# Patient Record
Sex: FEMALE | Race: WHITE | NOT HISPANIC OR LATINO | Employment: OTHER | URBAN - METROPOLITAN AREA
[De-identification: names, ages, dates, MRNs, and addresses within clinical notes are randomized per-mention and may not be internally consistent; named-entity substitution may affect disease eponyms.]

---

## 2023-11-09 ENCOUNTER — HOSPITAL ENCOUNTER (OUTPATIENT)
Dept: RADIOLOGY | Facility: HOSPITAL | Age: 78
Discharge: HOME | End: 2023-11-09
Payer: MEDICARE

## 2023-11-09 DIAGNOSIS — R92.8 OTHER ABNORMAL AND INCONCLUSIVE FINDINGS ON DIAGNOSTIC IMAGING OF BREAST: ICD-10-CM

## 2023-11-09 DIAGNOSIS — R92.8 ABNORMAL FINDINGS ON DIAGNOSTIC IMAGING OF BREAST: ICD-10-CM

## 2023-11-09 DIAGNOSIS — R92.1 CALCIFICATION OF LEFT BREAST: Primary | ICD-10-CM

## 2023-11-09 PROCEDURE — 76642 ULTRASOUND BREAST LIMITED: CPT | Mod: 50

## 2023-11-09 PROCEDURE — 77062 BREAST TOMOSYNTHESIS BI: CPT

## 2023-11-09 PROCEDURE — 76642 ULTRASOUND BREAST LIMITED: CPT | Performed by: RADIOLOGY

## 2023-11-09 PROCEDURE — G0279 TOMOSYNTHESIS, MAMMO: HCPCS | Performed by: RADIOLOGY

## 2023-11-09 PROCEDURE — 77066 DX MAMMO INCL CAD BI: CPT | Performed by: RADIOLOGY

## 2023-11-09 NOTE — NURSING NOTE
Patient daughter Suzanne included in results and education review at patient request. After patient review of diagnostic results with Dr. Rodas, support provided. Written literature regarding abnormal breast imaging and breast biopsy including what to expect before, during, and after the procedure reviewed with the patient. All questions answered. Patient and daughter expressed preference for appointments after 1pm related to transportation arrangements. Able to accommodate preferences during scheduling. Patient selected Dr. Jack for surgical consultation 11/20 1415 with biopsy to follow 11/22 1415. Information provided and reviewed to include provider information and how to reach me directly with questions or concerns before concluding visit.

## 2023-11-09 NOTE — PROGRESS NOTES
Patient follow up scheduling:  left breast stereotactic biopsy per provider recommendation, 11/22 1415 per patient scheduling preferences. Order pending Dr. Jack signature.

## 2023-11-22 ENCOUNTER — HOSPITAL ENCOUNTER (OUTPATIENT)
Dept: RADIOLOGY | Facility: HOSPITAL | Age: 78
Discharge: HOME | End: 2023-11-22
Payer: MEDICARE

## 2023-11-22 DIAGNOSIS — R92.8 ABNORMAL FINDINGS ON DIAGNOSTIC IMAGING OF BREAST: ICD-10-CM

## 2023-11-22 DIAGNOSIS — R92.1 BREAST CALCIFICATION SEEN ON MAMMOGRAM: ICD-10-CM

## 2023-11-22 DIAGNOSIS — R92.1 CALCIFICATION OF LEFT BREAST: ICD-10-CM

## 2023-11-22 PROCEDURE — 77065 DX MAMMO INCL CAD UNI: CPT | Mod: LT

## 2023-11-22 PROCEDURE — 88305 TISSUE EXAM BY PATHOLOGIST: CPT | Mod: TC,SUR,PORLAB | Performed by: SURGERY

## 2023-11-22 PROCEDURE — 88305 TISSUE EXAM BY PATHOLOGIST: CPT | Mod: TC,PORLAB | Performed by: SURGERY

## 2023-11-22 PROCEDURE — 19081 BX BREAST 1ST LESION STRTCTC: CPT | Mod: LEFT SIDE

## 2023-11-22 PROCEDURE — 88305 TISSUE EXAM BY PATHOLOGIST: CPT | Performed by: PATHOLOGY

## 2023-11-22 PROCEDURE — 77065 DX MAMMO INCL CAD UNI: CPT | Mod: LEFT SIDE

## 2023-11-22 PROCEDURE — 2720000007 HC OR 272 NO HCPCS

## 2023-11-22 PROCEDURE — 19081 BX BREAST 1ST LESION STRTCTC: CPT | Mod: LT

## 2023-11-29 ENCOUNTER — HOSPITAL ENCOUNTER (OUTPATIENT)
Dept: CARDIOLOGY | Facility: HOSPITAL | Age: 78
Discharge: HOME | End: 2023-11-29
Payer: MEDICARE

## 2023-11-29 DIAGNOSIS — I89.0 LYMPHEDEMA, NOT ELSEWHERE CLASSIFIED: ICD-10-CM

## 2023-11-29 DIAGNOSIS — R03.0 ELEVATED BLOOD-PRESSURE READING, WITHOUT DIAGNOSIS OF HYPERTENSION: ICD-10-CM

## 2023-11-29 LAB
AORTIC VALVE MEAN GRADIENT: 7
AVA (VTI): 2.22
EJECTION FRACTION APICAL 4 CHAMBER: 69.8
EJECTION FRACTION: 68
LEFT ATRIUM VOLUME AREA LENGTH INDEX BSA: 28.5
LEFT VENTRICLE INTERNAL DIMENSION DIASTOLE: 4.4 (ref 3.5–6)
LEFT VENTRICULAR OUTFLOW TRACT DIAMETER: 2
MITRAL VALVE E/A RATIO: 1.26
MITRAL VALVE E/E' RATIO: 12.72
RIGHT VENTRICLE PEAK SYSTOLIC PRESSURE: 47.1

## 2023-11-29 PROCEDURE — 93306 TTE W/DOPPLER COMPLETE: CPT

## 2023-11-29 PROCEDURE — 93306 TTE W/DOPPLER COMPLETE: CPT | Performed by: INTERNAL MEDICINE

## 2023-11-30 LAB
LABORATORY COMMENT REPORT: NORMAL
PATH REPORT.FINAL DX SPEC: NORMAL
PATH REPORT.GROSS SPEC: NORMAL
PATH REPORT.RELEVANT HX SPEC: NORMAL
PATH REPORT.TOTAL CANCER: NORMAL

## 2024-03-19 ENCOUNTER — OFFICE VISIT (OUTPATIENT)
Dept: WOUND CARE | Facility: CLINIC | Age: 79
End: 2024-03-19
Payer: MEDICARE

## 2024-03-19 PROCEDURE — 99203 OFFICE O/P NEW LOW 30 MIN: CPT | Performed by: CLINICAL NURSE SPECIALIST

## 2024-03-19 PROCEDURE — 11042 DBRDMT SUBQ TIS 1ST 20SQCM/<: CPT | Mod: ZK

## 2024-03-19 PROCEDURE — 99213 OFFICE O/P EST LOW 20 MIN: CPT | Mod: 25

## 2024-03-19 PROCEDURE — 11042 DBRDMT SUBQ TIS 1ST 20SQCM/<: CPT | Performed by: CLINICAL NURSE SPECIALIST

## 2024-03-22 ENCOUNTER — CLINICAL SUPPORT (OUTPATIENT)
Dept: WOUND CARE | Facility: CLINIC | Age: 79
End: 2024-03-22
Payer: MEDICARE

## 2024-03-22 PROCEDURE — 99213 OFFICE O/P EST LOW 20 MIN: CPT

## 2024-05-15 ENCOUNTER — HOSPITAL ENCOUNTER (OUTPATIENT)
Dept: RADIOLOGY | Facility: CLINIC | Age: 79
Discharge: HOME | End: 2024-05-15
Payer: MEDICARE

## 2024-05-15 DIAGNOSIS — Z78.0 ASYMPTOMATIC MENOPAUSAL STATE: ICD-10-CM

## 2024-05-15 PROCEDURE — 77080 DXA BONE DENSITY AXIAL: CPT

## 2024-05-21 ENCOUNTER — APPOINTMENT (OUTPATIENT)
Dept: OCCUPATIONAL THERAPY | Facility: HOSPITAL | Age: 79
End: 2024-05-21
Payer: MEDICAID

## 2024-05-30 ENCOUNTER — EVALUATION (OUTPATIENT)
Dept: OCCUPATIONAL THERAPY | Facility: HOSPITAL | Age: 79
End: 2024-05-30
Payer: MEDICARE

## 2024-05-30 DIAGNOSIS — I89.0 LYMPHEDEMA, NOT ELSEWHERE CLASSIFIED: Primary | ICD-10-CM

## 2024-05-30 PROCEDURE — 97535 SELF CARE MNGMENT TRAINING: CPT | Mod: GO | Performed by: OCCUPATIONAL THERAPIST

## 2024-05-30 PROCEDURE — 97165 OT EVAL LOW COMPLEX 30 MIN: CPT | Mod: GO | Performed by: OCCUPATIONAL THERAPIST

## 2024-05-30 ASSESSMENT — PAIN SCALES - GENERAL: PAINLEVEL_OUTOF10: 0 - NO PAIN

## 2024-05-30 ASSESSMENT — PATIENT HEALTH QUESTIONNAIRE - PHQ9
2. FEELING DOWN, DEPRESSED OR HOPELESS: NOT AT ALL
1. LITTLE INTEREST OR PLEASURE IN DOING THINGS: NOT AT ALL
SUM OF ALL RESPONSES TO PHQ9 QUESTIONS 1 AND 2: 0

## 2024-05-30 ASSESSMENT — PAIN - FUNCTIONAL ASSESSMENT: PAIN_FUNCTIONAL_ASSESSMENT: 0-10

## 2024-05-30 ASSESSMENT — ENCOUNTER SYMPTOMS
LOSS OF SENSATION IN FEET: 0
DEPRESSION: 0
OCCASIONAL FEELINGS OF UNSTEADINESS: 1

## 2024-05-30 ASSESSMENT — ACTIVITIES OF DAILY LIVING (ADL): HOME_MANAGEMENT_TIME_ENTRY: 10

## 2024-05-30 NOTE — PROGRESS NOTES
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   Recommend inelastic wraps for reduction.  Future recommendations for compression knee highs for maintenance.      Plan:  Treatment Interventions: ADL retraining, Patient/family training, Equipment evaluation/education  Treatment Interventions: ADL retraining, Patient/family training, Equipment evaluation/education  Frequency: 1 x/wk  Duration: 12 weeks  Rehab Potential: Good  Plan of Care Agreement: Patient  Pt was provided with education on the lymphatic system, lymphedema and what treatment will include.  Education today touched on compression, exercises, skin care and MLD.  Pt is to elevate as able and increase activity, especially walking.  Compression was initiated.  Pt was fitted with double size G tg .  Pt was instructed on use, care, application and precautions of tg .       Subjective   Current Problem:  1. Lymphedema, not elsewhere classified  Referral to Occupational Therapy    Follow Up In Occupational Therapy        General:   OT Received On: 24  General  Reason for Referral: LE lymphedema  Referred By: Marisa Dawkins  Pt lives alone. Family lives next door. She is independent with ADLs.  States she sleeps in a chair. She elevates legs.  She walks with two canes.    Precautions:  STEADI Fall Risk Score (The score of 4 or more indicates an increased risk of falling): 5  None  Pain:  Pain Assessment  Pain Assessment: 0-10  Pain Score: 0 - No pain  Pain Location: Leg  Pain Orientation:  (bilateral)      Objective      Lymphedema Assessment     Right Lower Extremity:  R Metatarsal (cm): 23 cm  R Heel Y Angle: 32 cm  R Ankle (cm): 30.5 cm  R 10 cm Above Ankle (cm): 34 cm  R 20 cm Above Ankle (cm): 45 cm  R 30 cm Above Ankle (cm): 55 cm  R 40 cm Above Ankle (cm): 0 cm  R Knee (cm): 0 cm  R 10 cm Above Knee (cm): 0 cm  R 20 cm Above Knee (cm): 0 cm  R 30 cm Above Knee (cm): 0 cm  R 40 cm Above Knee (cm): 0 cm  R 50 cm Above Knee (cm): 0 cm  Right lower extremity total: 219.5  Left Lower Extremity:  L Metatarsal (cm): 23.5 cm  L Heel Y Angle: 32.5 cm  L Ankle (cm): 30.5 cm  L 10 cm Above Ankle (cm): 37 cm  L 20 cm Above Ankle (cm): 48 cm  L 30 cm Above Ankle (cm): 55 cm  L 40 cm Above Ankle (cm): 0 cm  L Knee (cm): 0 cm  L 10 cm Above Knee (cm): 0 cm  L 20 cm Above Knee (cm): 0 cm  L 30 cm Above Knee (cm): 0 cm  L 40 cm Above Knee (cm): 0 cm  L 50 cm Above Knee (cm): 0 cm  Left Lower extremity total: 226.5  LE Skin Appearance/Condition and Girth:  Dryness: yes  Edema - Fibrotic: yes  Edema - Pitting: yes  Hyperpigmentation: yes  +Stemmer Sign: yes     Pain Assessment:  Pain Assessment: 0-10  Pain Score: 0 - No pain  Pain Location: Leg  Pain Orientation:  (bilateral)  Outcome Measures: LLIS score of 50.     OP EDUCATION:  Education  Individual(s) Educated: Patient  Education Provided: Diagnosis & Precautions, Edema control, Lymphedema, Symptom management, Risk and benefits of OT discussed with patient or other, POC discussed and agreed upon  Home Program: Edema control, Handout issued  Equipment: Tubigrip  Risk and Benefits Discussed with Patient/Caregiver/Other: yes  Patient/Caregiver Demonstrated Understanding: yes  Plan of Care Discussed and Agreed Upon: yes  Patient Response to Education: Patient/Caregiver Verbalized Understanding of Information    Goals:  Active       OT Goals       OT Goal 1       Start:  05/30/24    Expected End:  08/30/24       STG: Pt will be independent with self management, including use of garments,  pump if indicated, self MLD, ability to recognize signs of infection/cellulitis and exercise/HEP to minimize risk of progression of symptoms and skin infections/cellulitis.   LTG: Pt will show improvement on the LLIS impairment scale to no greater than 20% impaired.    STG: Decrease girth in bilateral LEs by 25 cm each for improved mobility and better clothing/shoe fit.              OT Problem       PATIENT STATED GOAL decrease size of legs.        Start:  05/30/24    Expected End:  08/30/24       PATIENT STATED GOAL decrease size of legs.

## 2024-06-06 ENCOUNTER — TREATMENT (OUTPATIENT)
Dept: OCCUPATIONAL THERAPY | Facility: HOSPITAL | Age: 79
End: 2024-06-06
Payer: MEDICARE

## 2024-06-06 DIAGNOSIS — I89.0 LYMPHEDEMA, NOT ELSEWHERE CLASSIFIED: ICD-10-CM

## 2024-06-06 PROCEDURE — 97110 THERAPEUTIC EXERCISES: CPT | Mod: GO,CO

## 2024-06-06 PROCEDURE — 97535 SELF CARE MNGMENT TRAINING: CPT | Mod: GO,CO

## 2024-06-06 PROCEDURE — 97140 MANUAL THERAPY 1/> REGIONS: CPT | Mod: GO,CO

## 2024-06-06 ASSESSMENT — PAIN - FUNCTIONAL ASSESSMENT: PAIN_FUNCTIONAL_ASSESSMENT: 0-10

## 2024-06-06 ASSESSMENT — PAIN SCALES - GENERAL: PAINLEVEL_OUTOF10: 3

## 2024-06-06 ASSESSMENT — ACTIVITIES OF DAILY LIVING (ADL): HOME_MANAGEMENT_TIME_ENTRY: 15

## 2024-06-06 NOTE — PROGRESS NOTES
Occupational Therapy    Occupational Therapy Treatment    Name: Brianna Ngo  MRN: [id number]  : 1945  Date: 2024  Time Calculation  Start Time: 0800  Stop Time: 0900  Time Calculation (min): 60 min     OT Therapeutic Procedures Time Entry  Manual Therapy Time Entry: 30  Self Care/Home Management (ADLs) Time Entry: 15  Therapeutic Exercise Time Entry: 15                 Visit number: 2    Assessment:     Plan:     Cont skilled OT to decrease edema and pain via compression, elevation exercises and massage. Improve AROM and balance to improve ease of ADLs.      Subjective Pt. States that she has been wearing the TG  everyday.   General:  Pt arrived without wearing compression. Because she had her right great toe naill removed and she hit it with the cane this am and it is bleeding.         Objective    Therapy/Activity:   SELF CARE: Educated in anatomy of lymph system and tx including elevation, compression, massage and HEP. Inst Pt to wear compression from the time she gets up OOB to bedtime. If she can she is encouraged to wear at night if she is able to tolerate.   MANUAL: Inst in self MLD anterior approach. Clavicular notches. Chest and axilla abdominal series, inguinals and BLEs.  Pt given written inst to follow to perform self MLD.   THERAPEUTIC EXERCISE:   Lymphedema Assessment       Right Lower Extremity:  R Metatarsal (cm): 23 cm  R Heel Y Angle: 32 cm  R Ankle (cm): 29 cm  R 10 cm Above Ankle (cm): 35.5 cm  R 20 cm Above Ankle (cm): 45.5 cm  R 30 cm Above Ankle (cm): 56.2 cm  R 40 cm Above Ankle (cm): 0 cm  R Knee (cm): 0 cm  R 10 cm Above Knee (cm): 0 cm  R 20 cm Above Knee (cm): 0 cm  R 30 cm Above Knee (cm): 0 cm  R 40 cm Above Knee (cm): 0 cm  R 50 cm Above Knee (cm): 0 cm  Right lower extremity total: 221.2  Left Lower Extremity:  L Metatarsal (cm): 23 cm  L Heel Y Angle: 32.2 cm  L Ankle (cm): 29.2 cm  L 10 cm Above Ankle (cm): 39.2 cm  L 20 cm Above Ankle (cm): 52 cm  L 30 cm Above  Ankle (cm): 55.6 cm  L 40 cm Above Ankle (cm): 0 cm  L Knee (cm): 0 cm  L 10 cm Above Knee (cm): 0 cm  L 20 cm Above Knee (cm): 0 cm  L 30 cm Above Knee (cm): 0 cm  L 40 cm Above Knee (cm): 0 cm  L 50 cm Above Knee (cm): 0 cm  Left Lower extremity total: 231.2  LE Skin Appearance/Condition and Girth:  Dryness: yes  Edema - Fibrotic: yes  Edema - Pitting: yes  Hyperpigmentation: yes  +Stemmer Sign: yes    Pain Assessment:  Pain Assessment: 0-10  Pain Score: 3  Pain Location: Leg  Pain Orientation: Right  Therapy Precautions:   Medical Precautions: Lymphedema precautions      OP EDUCATION:   Inst pt in diaphragmatic breathing and self MLD and gave written inst     Goals:  Active       OT Goals       OT Goal 1       Start:  05/30/24    Expected End:  08/30/24       STG: Pt will be independent with self management, including use of garments, pump if indicated, self MLD, ability to recognize signs of infection/cellulitis and exercise/HEP to minimize risk of progression of symptoms and skin infections/cellulitis.   LTG: Pt will show improvement on the LLIS impairment scale to no greater than 20% impaired.    STG: Decrease girth in bilateral LEs by 25 cm each for improved mobility and better clothing/shoe fit.              OT Problem       PATIENT STATED GOAL decrease size of legs.        Start:  05/30/24    Expected End:  08/30/24       PATIENT STATED GOAL decrease size of legs.

## 2024-07-30 DIAGNOSIS — E83.52 HYPERCALCEMIA: Primary | ICD-10-CM

## 2025-02-24 ENCOUNTER — OFFICE VISIT (OUTPATIENT)
Dept: WOUND CARE | Facility: CLINIC | Age: 80
End: 2025-02-24
Payer: MEDICARE

## 2025-02-24 DIAGNOSIS — L03.115 CELLULITIS OF RIGHT LOWER LEG: Primary | ICD-10-CM

## 2025-03-02 LAB
BACTERIA SPEC AEROBE CULT: ABNORMAL
BACTERIA SPEC ANAEROBE CULT: ABNORMAL

## 2025-03-04 ENCOUNTER — APPOINTMENT (OUTPATIENT)
Dept: WOUND CARE | Facility: CLINIC | Age: 80
End: 2025-03-04
Payer: MEDICARE

## 2025-03-05 ENCOUNTER — OFFICE VISIT (OUTPATIENT)
Dept: WOUND CARE | Facility: CLINIC | Age: 80
End: 2025-03-05
Payer: MEDICARE

## 2025-03-05 PROCEDURE — 99213 OFFICE O/P EST LOW 20 MIN: CPT

## 2025-07-03 ENCOUNTER — APPOINTMENT (OUTPATIENT)
Dept: OCCUPATIONAL THERAPY | Facility: HOSPITAL | Age: 80
End: 2025-07-03
Payer: MEDICARE